# Patient Record
(demographics unavailable — no encounter records)

---

## 2024-10-30 NOTE — REVIEW OF SYSTEMS
[Feeling Fatigued] : feeling fatigued [Dyspnea on exertion] : dyspnea during exertion [Negative] : Gastrointestinal

## 2024-10-31 NOTE — HISTORY OF PRESENT ILLNESS
[FreeTextEntry1] : Mr. Thorne is 59 y.o. 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  Currently, he feels tired and short of breath after walking for 1 block or 1 flight of stairs. He has had this ET over the last 2 years. No recent CBC in the chart. Patient follows with cardiologist  who started him on GDMT 2 years ago. His weight has been stable 182-185 lbs off diuretics. Patient is euvolemic on exam. Cr 0.91 K 5.1 HgA1C 8.3. TTE EF 31% 06/1/2023  Patient had a discussion with  and expressed an interest in Barostim/CCM device which can potentially improve exercise tolerance and persistent dyspnea on exertion.

## 2024-10-31 NOTE — ASSESSMENT
[FreeTextEntry1] : CAD/MI, ischemic cardiomyopathy, PVCs induced VT s/p ICD, chronic fatigue and dyspnea on exertion in spite of GDMT. He is euvolemic on exam today. Patient hasn't had a TTE since 2023 and no recent blood BNP available.   6MWT - 290 m, lowest SaO2 98% on RA NYHA class II  Plan:  No change in GDMT till further evaluation: Continue Entresto 24-26 mg BID Continue Metoprolol 25 mg QD Continue Farxiga 10 gm daily Continue Spironolactone 25 mg daily Patient is euvolemic off diuretics Repeat TTE prior to next visit Will get CPET to further assess the source of his exercise limitation.  Blood work including pro-BNP Possible RHC based on above tests Will assess for Barostim vs CCM after CPET /TTE and possibly RHC RTO in 6 weeks    Darleen Oh MD, FACC, [facility]  Advanced Heart Failure/ Mechanical Circulatory Support Pulmonary Hypertension and Cardiac Amyloidosis  [facility]

## 2024-10-31 NOTE — ASSESSMENT
[FreeTextEntry1] : CAD/MI, ischemic cardiomyopathy, PVCs induced VT s/p ICD, chronic fatigue and dyspnea on exertion in spite of GDMT. He is euvolemic on exam today. Patient hasn't had a TTE since 2023 and no recent blood BNP available.   6MWT - 290 m, lowest SaO2 98% on RA NYHA class II  Plan:  No change in GDMT till further evaluation: Continue Entresto 24-26 mg BID Continue Metoprolol 25 mg QD Continue Farxiga 10 gm daily Continue Spironolactone 25 mg daily Patient is euvolemic off diuretics Repeat TTE prior to next visit Will get CPET to further assess the source of his exercise limitation.  Blood work including pro-BNP Possible RHC based on above tests Will assess for Barostim vs CCM after CPET /TTE and possibly RHC RTO in 6 weeks    Darleen Oh MD, FACC, [facility]  Advanced Heart Failure/ Mechanical Circulatory Support Pulmonary Hypertension and Cardiac Amyloidosis  [facility]    [Fever] : fever [Nasal Discharge] : nasal discharge [Nasal Congestion] : nasal congestion [Negative] : Genitourinary

## 2024-12-14 NOTE — ASSESSMENT
[FreeTextEntry1] : CAD/MI, ischemic cardiomyopathy, PVCs induced VT s/p ICD, chronic fatigue and dyspnea on exertion in spite of GDMT. He is euvolemic on exam today. Patient hasn't had a TTE since 2023 and no recent blood BNP available.   6MWT - 290 m, lowest SaO2 98% on RA NYHA class II  Plan:   Continue Entresto 24-26 mg BID Increase Metoprolol 25 mg to twice daily Continue Farxiga 10 gm daily Continue Spironolactone 25 mg daily Patient is euvolemic off diuretics Will  send to cardiac rehab  Will call in two weeks to assess response to increase BB and keep titrating as tolerated  Will assess for Barostim after repeating TTE next visit  RTO in 3 months    Darleen Oh MD, FACC, [facility]  Advanced Heart Failure/ Mechanical Circulatory Support Pulmonary Hypertension and Cardiac Amyloidosis  [facility]

## 2024-12-14 NOTE — HISTORY OF PRESENT ILLNESS
[FreeTextEntry1] : Mr. Thorne is 59 y.o. 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  Currently, he feels tired and short of breath after walking for 1 block or 1 flight of stairs. He has had this ET over the last 2 years. No recent CBC in the chart. Patient follows with cardiologist  who started him on GDMT 2 years ago. His weight has been stable 182-185 lbs off diuretics. Patient is euvolemic on exam. Cr 0.91 K 5.1 HgA1C 8.3. TTE EF 31% 06/1/2023  Patient had a discussion with  and expressed an interest in Barostim/CCM device which can potentially improve exercise tolerance and persistent dyspnea on exertion.  12/12/2024: Patient presents for F/U visit on ischemic cardiomyopathy. He feels at baseline, no noticeable dyspnea with walking on a flat surface. Recent CPET was c/w physical deconditioning and possible need to optimize his GDMT. Today, BP 90/60; he doesn't check BP at home. He lost a few pounds while using Ozempic but self-discontinued it 3 weeks ago because of nausea and abdominal pain with improvement in these symptoms. Patient is euvolemic on exam off diuretics. proBNP 248 Cr 0.8 K 4.4

## 2024-12-14 NOTE — CARDIOLOGY SUMMARY
[de-identified] : CPET 11/06/2024: Decrease in ET is related to CM with component of deconditioning.  [de-identified] : TTE 11/01/2024: 1. Left ventricular cavity is normal in size. Left ventricular wall thickness is normal. Left ventricular systolic function is severely decreased with an ejection fraction of 32 % by Snow's method of disks. 2. Multiple segmental abnormalities exist. See findings. 3. There is mild (grade 1) left ventricular diastolic dysfunction. 4. Mild tricuspid regurgitation. 5. There is a device lead seen in the right atrium.

## 2024-12-14 NOTE — CARDIOLOGY SUMMARY
[de-identified] : CPET 11/06/2024: Decrease in ET is related to CM with component of deconditioning.  [de-identified] : TTE 11/01/2024: 1. Left ventricular cavity is normal in size. Left ventricular wall thickness is normal. Left ventricular systolic function is severely decreased with an ejection fraction of 32 % by Snow's method of disks. 2. Multiple segmental abnormalities exist. See findings. 3. There is mild (grade 1) left ventricular diastolic dysfunction. 4. Mild tricuspid regurgitation. 5. There is a device lead seen in the right atrium.

## 2024-12-14 NOTE — ASSESSMENT
[FreeTextEntry1] : CAD/MI, ischemic cardiomyopathy, PVCs induced VT s/p ICD, chronic fatigue and dyspnea on exertion in spite of GDMT. He is euvolemic on exam today. Patient hasn't had a TTE since 2023 and no recent blood BNP available.   6MWT - 290 m, lowest SaO2 98% on RA NYHA class II  Plan:   Continue Entresto 24-26 mg BID Increase Metoprolol 25 mg to twice daily Continue Farxiga 10 gm daily Continue Spironolactone 25 mg daily Patient is euvolemic off diuretics Will  send to cardiac rehab  Will call in two weeks to assess response to increase BB and keep titrating as tolerated  Will assess for Barostim after repeating TTE next visit  RTO in 3 months    Darleen Oh MD, FACC, [facility]  Advanced Heart Failure/ Mechanical Circulatory Support Pulmonary Hypertension and Cardiac Amyloidosis  [facility]

## 2025-03-05 NOTE — ASSESSMENT
[FreeTextEntry1] : Anterior wall myocardial infarction\par  AICD implant\par  Severe LV systolic dysfunction LVEF=25-30% previously, now LVEF=31%\par  CAD\par  Polymorphic VT\par  DM with elevated HgA1c.\par  Hyperlipidemia\par  Obesity\par  History of DFU with PVD

## 2025-03-05 NOTE — PHYSICAL EXAM
[Well Developed] : well developed [Well Nourished] : well nourished [No Acute Distress] : no acute distress [Normal Conjunctiva] : normal conjunctiva [Normal Venous Pressure] : normal venous pressure [No Carotid Bruit] : no carotid bruit [Normal S1, S2] : normal S1, S2 [No Rub] : no rub [No Gallop] : no gallop [Clear Lung Fields] : clear lung fields [Good Air Entry] : good air entry [No Respiratory Distress] : no respiratory distress  [Soft] : abdomen soft [Non Tender] : non-tender [No Masses/organomegaly] : no masses/organomegaly [Normal Bowel Sounds] : normal bowel sounds [Normal Gait] : normal gait [No Edema] : no edema [No Cyanosis] : no cyanosis [No Clubbing] : no clubbing [No Varicosities] : no varicosities [No Rash] : no rash [Moves all extremities] : moves all extremities [No Focal Deficits] : no focal deficits [Normal Speech] : normal speech [Alert and Oriented] : alert and oriented [Normal memory] : normal memory [de-identified] : S3 noted. AICD implant site is healed. [de-identified] : No crackles at bases [de-identified] : Venous stasis changes

## 2025-03-05 NOTE — DISCUSSION/SUMMARY
[EKG obtained to assist in diagnosis and management of assessed problem(s)] : EKG obtained to assist in diagnosis and management of assessed problem(s) [FreeTextEntry1] : Patient was advised to maintain his present medications. Patient had a viability scan performed on his first hospital admission and was found not to be a candidate for revascularization due to lack of myocardial viability. BMP CBC lipid panel and hepatic panel prior to the next office visit. Patient's repeat TTE showed slight improvement in his LVEF to 31% EKG: NSR rate of 68 bpm, prior AWMI Low sodium diet Low cholesterol and low fat diet. Continue with risk factor modification. Maintain an exercise regimen of >150 minutes per week as tolerated. Pt. is attending cardiac rehab at [facility]. CBC BMP lipid and hepatic panel, HgA1c level RV in 6 months

## 2025-03-05 NOTE — REASON FOR VISIT
[FreeTextEntry1] : Patient presents for a follow up Cardiology visit. He is off amiodarone now and has seen EPS. No symptoms reported. He is here to review his recent lab test.

## 2025-03-05 NOTE — HISTORY OF PRESENT ILLNESS
[FreeTextEntry1] : AWMI\par  LV systolic dysfunction\par  AICD implant\par  Polymorphic VT\par  DM\par  Nonhealing foot ulcers of LE\par  PVD\par  Hyperlipidemia\par  Patient denies a history of prior MI, CHF, arrhythmia, TIA, CVA, syncope, obesity, cigarette smoking.\par  PSurgHx: Right and left foot surgeries for nonhealing diabetic foot ulcers. Right foot was operated on 2021 left foot three years prior.\par  Patient had COVID-19 infection.\par  No prior cardiac testing performed.

## 2025-03-06 NOTE — HISTORY OF PRESENT ILLNESS
[de-identified] : Mr. PORTILLO is a 57 year-year old male with history of CAD/MI, PVD, DM, DL, Ischemic Cardiomyopathy, PVC induced polymorphic VT s/p DC-ICD (SJM, 22, Non-dep) is here for device care.  Feels great. Lost ~ 10 lbs intentional in last week.  11/9: Had PVC induced VF s/p ICD shock. Was admitted at [facility] for further work-up. 12/21/2022: S/p PVC/VF ablation. Feels fine. No further episodes.  03/22/2023: Feels fine. Saw Dr. Cortés. Amiodarone level low.  09/20/2023: Feels fine. No further episodes of palpitations or shocks. Lost weight (intentional). 03/20/2024: Feels fine. Gained some weight. Denies any complaints.   09/04/2024: Feels okay overall. Dyspnea on exertion.   03/05/2025: Feels fine. Denies any complaints. Continues to have mild TUCKER. Planning to see Dr. Araujo very soon.   Denies chest pain, shortness of breath, palpitation, dizziness or LOC except noted above.  EKG (03/05/2025): SR   EKG (09/04/2024): SR  EKG (03/20/2024): SR @ 93, , QRS 98, QTc 411   EKG (09/20/2023): SR @ 87, , QRS 98, QTc 440 EKG (03/22/2023): SR @ 77 bpm, , , , no PVCs. EKG (12/21/2022): SR @ 80, , ,  no PVCs.  EKG (11/9/22): SR EKG (09/14/22): SR 63, QRS 94 TTE (09/22): EF 25-30%, Nl RA/LA Cardio: Dr. Cortés

## 2025-03-06 NOTE — ASSESSMENT
[FreeTextEntry1] : ## Ischemic Cardiomyopathy  ## PVC induced polymorphic VT s/p DC-ICD (SJM, 22, Non-dep) s/p PVC/PMVT Ablation  - ICD interrogation shows normally functioning DC-ICD. Battery life ok. Optivol below threshold. No new events. - No further PMVT/ VF event - Continue with Metoprolol 25 mg. Can increase dose as needed by cardiology team.  - Remains off Amiodarone. No further episodes of PVC, PMVT, or VF. Will continue to monitor off Amiodarone.  - GDMT as per cardio.  - Patient continues to have TUCKER. Discussed the potential option of Barostim/CCM device. Information provided. He is interested. Will follow with Dr. Araujo to assess for eligibility. Considering his age and continue TUCKER along with GDMT, patient is likely a great candidate for CCM device. To be discussed with HF team.  - Remote Monitoring  - RTC in 6 months.

## 2025-03-06 NOTE — END OF VISIT
[FreeTextEntry3] :   All medical record entries made by my scribe were at my, Dr. Bernadette Kenney, direction and personally dictated by me. I have reviewed the chart and agree that the record accurately reflects my personal performance of the history, physical exam, assessment and plan. I have also personally directed, reviewed, and agreed with the chart.

## 2025-03-06 NOTE — PHYSICAL EXAM
[Normal Appearance] : normal appearance [General Appearance - Well Developed] : well developed [Well Groomed] : well groomed [General Appearance - Well Nourished] : well nourished [No Deformities] : no deformities [General Appearance - In No Acute Distress] : no acute distress [Heart Rate And Rhythm] : heart rate and rhythm were normal [Heart Sounds] : normal S1 and S2 [Murmurs] : no murmurs present [Clean] : clean [Dry] : dry [Healing Well] : healing well [Nail Clubbing] : no clubbing of the fingernails [Cyanosis, Localized] : no localized cyanosis [Petechial Hemorrhages (___cm)] : no petechial hemorrhages [] : no ischemic changes

## 2025-03-06 NOTE — HISTORY OF PRESENT ILLNESS
[de-identified] : Mr. PORTILLO is a 57 year-year old male with history of CAD/MI, PVD, DM, DL, Ischemic Cardiomyopathy, PVC induced polymorphic VT s/p DC-ICD (SJM, 22, Non-dep) is here for device care.  Feels great. Lost ~ 10 lbs intentional in last week.  11/9: Had PVC induced VF s/p ICD shock. Was admitted at [facility] for further work-up. 12/21/2022: S/p PVC/VF ablation. Feels fine. No further episodes.  03/22/2023: Feels fine. Saw Dr. Cortés. Amiodarone level low.  09/20/2023: Feels fine. No further episodes of palpitations or shocks. Lost weight (intentional). 03/20/2024: Feels fine. Gained some weight. Denies any complaints.   09/04/2024: Feels okay overall. Dyspnea on exertion.   03/05/2025: Feels fine. Denies any complaints. Continues to have mild TUCKER. Planning to see Dr. Araujo very soon.   Denies chest pain, shortness of breath, palpitation, dizziness or LOC except noted above.  EKG (03/05/2025): SR   EKG (09/04/2024): SR  EKG (03/20/2024): SR @ 93, , QRS 98, QTc 411   EKG (09/20/2023): SR @ 87, , QRS 98, QTc 440 EKG (03/22/2023): SR @ 77 bpm, , , , no PVCs. EKG (12/21/2022): SR @ 80, , ,  no PVCs.  EKG (11/9/22): SR EKG (09/14/22): SR 63, QRS 94 TTE (09/22): EF 25-30%, Nl RA/LA Cardio: Dr. Cortés

## 2025-03-06 NOTE — ADDENDUM
[FreeTextEntry1] : So SANTOS assisted in documentation on 03/06/2025 acting as a scribe for Dr. Bernadette Kenney.

## 2025-04-09 NOTE — ASSESSMENT
[FreeTextEntry1] : CAD/MI, ischemic cardiomyopathy, PVCs induced VT s/p ICD, chronic fatigue and dyspnea on exertion in spite of GDMT. He is euvolemic on exam today. Patient hasn't had a TTE since 2023 and no recent blood BNP available.   6MWT - 290 m, lowest SaO2 98% on RA NYHA class II  Plan:   Continue Entresto 24-26 mg BID Increase Metoprolol 25 mg to twice daily Continue Farxiga 10 gm daily Continue Spironolactone 25 mg daily Patient is euvolemic off diuretics Will  send to cardiac rehab  Will call in two weeks to assess response to increase BB and keep titrating as tolerated  I don't think there's a need to evaluate the patient for Barostim at this time RTO in 3 months    Darleen Oh MD, FACC, [facility]  Advanced Heart Failure/ Mechanical Circulatory Support Pulmonary Hypertension and Cardiac Amyloidosis  [facility]

## 2025-04-09 NOTE — CARDIOLOGY SUMMARY
[de-identified] : CPET 11/06/2024: Decrease in ET is related to CM with component of deconditioning.  [de-identified] : TTE 11/01/2024: 1. Left ventricular cavity is normal in size. Left ventricular wall thickness is normal. Left ventricular systolic function is severely decreased with an ejection fraction of 32 % by Snow's method of disks. 2. Multiple segmental abnormalities exist. See findings. 3. There is mild (grade 1) left ventricular diastolic dysfunction. 4. Mild tricuspid regurgitation. 5. There is a device lead seen in the right atrium.

## 2025-04-09 NOTE — REVIEW OF SYSTEMS
[Feeling Fatigued] : feeling fatigued [Dyspnea on exertion] : dyspnea during exertion [Negative] : Gastrointestinal [Dizziness] : no dizziness

## 2025-04-09 NOTE — HISTORY OF PRESENT ILLNESS
[FreeTextEntry1] : Mr. Thorne is 59 y.o. 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  Currently, he feels tired and short of breath after walking for 1 block or 1 flight of stairs. He has had this ET over the last 2 years. No recent CBC in the chart. Patient follows with cardiologist  who started him on GDMT 2 years ago. His weight has been stable 182-185 lbs off diuretics. Patient is euvolemic on exam. Cr 0.91 K 5.1 HgA1C 8.3. TTE EF 31% 06/1/2023  Patient had a discussion with  and expressed an interest in Barostim/CCM device which can potentially improve exercise tolerance and persistent dyspnea on exertion.  12/12/2024: Patient presents for F/U visit on ischemic cardiomyopathy. He feels at baseline, no noticeable dyspnea with walking on a flat surface. Recent CPET was c/w physical deconditioning and possible need to optimize his GDMT. Today, BP 90/60; he doesn't check BP at home. He lost a few pounds while using Ozempic but self-discontinued it 3 weeks ago because of nausea and abdominal pain with improvement in these symptoms. Patient is euvolemic on exam off diuretics. proBNP 248 Cr 0.8 K 4.4  04/09/2025: Patient presents for F/U visit for management of HFrEF.. He reports feeling at his baseline, no change in symptoms or ET since last visit in 12/2024, can walk for 20 min on a flat surface without dyspnea, denies chest pain, dizziness, leg edema, palpitations. Patient states that his weight has been stable, off diuretics, euvolemic on exam IVC 2.2 cm, collapsing >50%. BP 90/60 on average, 100/70 with activity. He had to stop cardiac rehab because he developed left foot ulcer 2 weeks ago. His HgA1C 8.4 as of February 2025, was not rechecked, medical management was not adjusted.   As of 02/2025, CR 0.77, BUN 20, K 5.0 HgA1C 8.4.   TTE done 4/3/2025 shows slightly improvement in LV systolic function, LVEF ~40% from low 30%.

## 2025-07-24 NOTE — BEGINNING OF VISIT
[0] : 2) Feeling down, depressed, or hopeless: Not at all (0) [PHQ-2 Negative] : PHQ-2 Negative [Never] : Never [Patient/Caregiver not ready to engage] : Patient/Caregiver not ready to engage

## 2025-07-26 NOTE — CONSULT LETTER
[Dear  ___] : Dear  [unfilled], [Courtesy Letter:] : I had the pleasure of seeing your patient, [unfilled], in my office today. [Please see my note below.] : Please see my note below. [Consult Closing:] : Thank you very much for allowing me to participate in the care of this patient.  If you have any questions, please do not hesitate to contact me. [Sincerely,] : Sincerely, [FreeTextEntry3] : (Shirlye Faat) Brendon Cárdenas DO Medical oncology/hematology at [facility]

## 2025-07-26 NOTE — RESULTS/DATA
[facility] DEPARTMENT ENCOUNTER    02/25/22 2:22 PM    CHIEF COMPLAINT   Chief Complaint   Patient presents with   • Jaundice Adult       HPI    Serene Cuba is a 19 year old adult with history of ADD, anxiety, depression, reactive airway disease, who presents to the ED with complaints of jaundice.  The patient states he noticed his skin appeared slightly yellow yesterday and when he woke up this morning he feels like the light of his eyes are more yellow in color.  He admits that he had some abdominal pain with nausea 4 days ago but that has since improved.  He is taking exogenous testosterone for gender transition (female to male).  States he has been on this medication since 01/06/2022.  He is concerned he may be on too high a dose at this medication because “all of his friends that her taking it have a lower dose. ”  He does not drink alcohol.  No other drug use.  Denies excessive Tylenol consumption.    History obtained from patient   used: no     PAST MEDICAL HISTORY    Past Medical History:   Diagnosis Date   • ADD (attention deficit disorder)    • Anxiety    • Depressive disorder    • RAD (reactive airway disease)        SURGICAL HISTORY    Past Surgical History:   Procedure Laterality Date   • Hernia repair Right        CURRENT MEDICATIONS    No current facility-administered medications for this encounter.     Current Outpatient Medications   Medication Sig Dispense Refill   • [START ON 2/28/2022] Testosterone Cypionate 200 MG/ML Solution Inject 0.5 mLs as directed 1 day a week. 5 mL 5   • Needle, Disp, 25G X 5/8\" Misc Use weekly 12 each 3   • Syringe, Disposable, (Syringe Luer Slip) 1 ML Misc 1 each 1 day a week. 12 each 3   • Needle, Disp, 18G X 1\" Misc Use once weekly to draw medication from vial. Dispose of needle after drawing up medication. 12 each 3   • Vitamin D, Ergocalciferol, 1.25 mg (50,000 units) capsule Take 1 capsule by mouth 1 day a week. 12 capsule 0   • Cholecalciferol  (Vitamin D-3) 125 mcg (5,000 units) tablet Take 1 tablet by mouth daily. 30 tablet 0   • IPRATROPIUM-ALBUTEROL IN          ALLERGIES   ALLERGIES:   Allergen Reactions   • Lactose Intolerance   (Food Or Med) DIARRHEA       SOCIAL HISTORY    Social History     Tobacco Use   • Smoking status: Never Smoker   • Smokeless tobacco: Never Used   Vaping Use   • Vaping Use: never used   Substance Use Topics   • Alcohol use: No     Alcohol/week: 0.0 standard drinks   • Drug use: No       FAMILY HISTORY    Family History   Problem Relation Age of Onset   • Anxiety disorder Mother    • Depression Mother    • Heart disease Paternal Grandfather    • Diabetes Paternal Aunt    • Diabetes Paternal Uncle        REVIEW OF SYSTEMS    Review of Systems   Constitutional: Negative for chills and fever.   Eyes:        Yellow discoloration to the eyes    Respiratory: Negative for shortness of breath.    Cardiovascular: Negative for chest pain.   Gastrointestinal: Positive for abdominal pain and nausea. Negative for diarrhea and vomiting.   Genitourinary:        Dark colored urine    Skin:        Jaundice.    All other systems reviewed and are negative.       PHYSICAL EXAM    Vitals:    02/25/22 1533 02/25/22 1633 02/25/22 1733 02/25/22 1752   BP:    112/70   BP Location:       Patient Position:       Pulse: 79 88 78 74   Resp: 19   20   Temp:       TempSrc:       SpO2: 98% 99% 98% 100%   Weight:       Height:       LMP: 01/01/2022       Physical Exam  Vitals and nursing note reviewed.   Constitutional:       Appearance: Normal appearance.   HENT:      Head: Normocephalic and atraumatic.      Right Ear: External ear normal.      Left Ear: External ear normal.      Mouth/Throat:      Mouth: Mucous membranes are moist.   Eyes:      General: Scleral icterus present.      Conjunctiva/sclera: Conjunctivae normal.   Cardiovascular:      Rate and Rhythm: Normal rate.   Pulmonary:      Effort: Pulmonary effort is normal. No respiratory distress.    Abdominal:      General: Abdomen is flat.      Palpations: Abdomen is soft.      Tenderness: There is abdominal tenderness (mild to the RUQ). There is no guarding or rebound.   Musculoskeletal:      Cervical back: Normal range of motion.      Comments: FROM of bilateral upper and lower extremities.   Skin:     General: Skin is warm and dry.      Coloration: Skin is jaundiced.   Neurological:      General: No focal deficit present.      Mental Status: He is alert and oriented to person, place, and time.   Psychiatric:         Mood and Affect: Mood normal.         Behavior: Behavior normal.          LABS    Results for orders placed or performed during the hospital encounter of 02/25/22   Comprehensive Metabolic Panel   Result Value    Fasting Status     Sodium 136    Potassium 4.1     Comment: Slight hemolysis, result may be falsely increased.    Chloride 101    Carbon Dioxide 25    Anion Gap 14    Glucose 100 (H)    BUN 7    Creatinine 0.84    Glomerular Filtration Rate >90     Comment: eGFR results = or >60 mL/min/1.73m2 = Normal kidney function. Estimated GFR calculated using the 2009 CKD-EPI creatinine equation.      BUN/ Creatinine Ratio 8    Calcium 8.9    Bilirubin, Total 6.9 (H)    GOT/AST 1,200 (H)     Comment: Slight hemolysis, result may be falsely increased.    GPT/ALT 1,772 (H)    Alkaline Phosphatase 236 (H)    Albumin 3.8    Protein, Total 7.0    Globulin 3.2    A/G Ratio 1.2   CBC with Automated Differential (performable only)   Result Value    WBC 6.9    RBC 5.31 (H)    HGB 16.0    HCT 46.6    MCV 87.8    MCH 30.1    MCHC 34.3    RDW-CV 13.1    RDW-SD 42.2        NRBC 0    Neutrophil, Percent 52     Comment: Auto diff verified by manual slide review    Lymphocytes, Percent 28    Mono, Percent 17    Eosinophils, Percent 2    Basophils, Percent 1    Immature Granulocytes 0    Absolute Neutrophils 3.6    Absolute Lymphocytes 1.9    Absolute Monocytes 1.1 (H)    Absolute Eosinophils  0.2     Absolute Basophils 0.0    Absolute Immmature Granulocytes 0.0   Urinalysis & Reflex Microscopy With Culture If Indicated   Result Value    COLOR, URINALYSIS Kirsten    APPEARANCE, URINALYSIS Clear    GLUCOSE, URINALYSIS Negative    BILIRUBIN, URINALYSIS Positive (A)    KETONES, URINALYSIS Negative    SPECIFIC GRAVITY, URINALYSIS 1.010    OCCULT BLOOD, URINALYSIS Negative    PH, URINALYSIS 6.0    PROTEIN, URINALYSIS Negative    UROBILINOGEN, URINALYSIS 1.0    NITRITE, URINALYSIS Negative    LEUKOCYTE ESTERASE, URINALYSIS Trace (A)    SQUAMOUS EPITHELIAL, URINALYSIS 1 to 5    ERYTHROCYTES, URINALYSIS None Seen    LEUKOCYTES, URINALYSIS 1 to 5    BACTERIA, URINALYSIS None Seen    HYALINE CASTS, URINALYSIS None Seen   Acetaminophen Level   Result Value    Acetaminophen <2 (L)   Prothrombin Time   Result Value    Prothrombin Time 12.4 (H)    INR 1.2     Comment: INR Therapeutic Range: 2.0 to 3.0 (2.5 to 3.5 recommended for recurrent thrombotic episodes and mechanical prosthetic heart valves.)   Partial Thromboplastin Time   Result Value    PTT 30       ED MEDICATIONS      ED Medication Orders (From admission, onward)    Ordered Start     Status Ordering Provider    02/25/22 1521 02/25/22 1530  sodium chloride (NORMAL SALINE) 0.9 % bolus 1,000 mL  ONCE         Last MAR action: Completed LAURENT SUAREZ          PROCEDURES    Procedures       ED Course and MEDICAL DECISION MAKING    Serene Cuba is a 19 year old adult comes to the ED with leonora. Please see HPI and exam for further details. Patients medical records as well as past medical/surgical/family/social history were reviewed.  Patient is resting, NAD. He appears jaundiced with scleral icterus. Mild tenderness to the RUQ on exam. He is on testosterone for gender transition for the past 2 months.   Plan for labs, urine, US.   Will re-evaluate.     Reassessment:   Labs as noted above.   No leukocytosis or anemia.   Pt with significant elevation in LFT's. AST is 1200,  ALT is 1772, and Alk Phos is 236 with total bili of 6.9.   US GB/Liver/Pancreas obtained and unremarkable.     I spoke with JACK Ward, who recommends obtaining INR and hepatitis panel. Will call him back after these results.     At this point, I have considered hepatotoxicity related to exogenous testosterone, possible hepatitis, other drugs or oral viral illness.    Reassessment:   INR normal.   Hep panel pending -- send out.   Acetaminophen level normal.   UA with no signs of infection.     I spoke with JACK Ward, again. Patient is otherwise feeling well at this time. No significant abdominal pain. No vomiting. No AMS or confusion. Will plan for dc home with close follow up with PCP and GI. Patient is to STOP testosterone.  I will place orders for repeat CMP and INR to be performed in 2 days.     I discussed plan with patient and Mom who are in agreement with this plan.   Patient will stop testosterone.   I discussed strict return precautions.   All questions answered.     Impression:  The primary encounter diagnosis was Jaundice. A diagnosis of Elevated LFTs was also pertinent to this visit.    Follow Up:  Rodney Cervantes MD  [address] [facility] [id number]    Schedule an appointment as soon as possible for a visit   for follow up with GI    [facility] Emergency Department  [address] [facility] [address] [phone]  Go to   If symptoms worsen    Dalila Valle MD  [address] [facility] [address] [phone]    Go to   for follow up with pcp    Instructions  STOP testosterone   Get repeat labs drawn on Monday at [place] outpatient lab   Follow up with primary doctor as well as GI doctor   Return with any worsening symptoms: abdominal pain, vomiting, not acting normally, confusion, fever           Discharge Medication List as of 2/25/2022  5:40 PM           Summary of your Discharge Medications      You have not been prescribed any medications.          Pt is discharged in stable condition.     Caitie NAM PA-C, am working under the clinical supervision of attending physician, Dr. Kilgore.        Caitie Miller PA-C  02/25/22 2584     [FreeTextEntry1] : PET/CT 7/26/25 IMPRESSION: Intense pathologic FDG uptake (SUV up to 11.1),co registering with known distal esophageal extending to the gastroesophageal junction mass measuring up to 9.2 cm in height on coronal image emission images In addition pathologic FDG uptake in multiple right paratracheal nodes, carinal , right and left hilar and left suprahilar lymph nodes suspicious for biologic tumor activity No other definite sites of pathologic FDG uptake

## 2025-07-26 NOTE — HISTORY OF PRESENT ILLNESS
[Treatment Protocol] : Treatment Protocol [ECOG Performance Status: 1 - Restricted in physically strenuous activity but ambulatory and able to carry out work of a light or sedentary nature] : Performance Status: 1 - Restricted in physically strenuous activity but ambulatory and able to carry out work of a light or sedentary nature, e.g., light house work, office work [de-identified] : 60-year-old male with history of CAD/MI, PVD, DM, DL, Ischemic Cardiomyopathy (EF 24% on TTE 7/6/25), PVC induced polymorphic VTs s/p DC-ICD presnted to the hospital from 7/5/25 to 7/15/25 due to SOB and fatigue. He was found to be hypotensive with hb 5.8 s/p 2U of PRBC. Additionally, he was found to have MSSA and group B strep bacteremia.  Patient underwent EGD and colonoscopy 7/9, which demonstrated at 10 cm infiltrative, fungating, ulcerated, necrotic mass with sequelae of recent bleeding that extended from 30 cm past incisors to cardia of the stomach. Patient denies any prior endoscopy studies. He also denies smoking or alcohol use (lifelong), or personal history of cancer. 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 No right heart strain. Lower esophageal wall thickening up to the proximal stomach measuring approximately 4.6 x 8.3 x 9.3 cm, compatible with endoscopy finding of esophageal mass.  IMPRESSION: 1.  Marked circumferential masslike distal esophageal wall thickening, compatible with known esophageal mass. 2.Nonspecific mixed sclerotic/lucent lesion in the left iliac bone. Benign etiology is favored. However, further evaluation with a PET/CT or nuclear medicine bone scan may be of use. 3.  No abdominopelvic lymphadenopathy or definite sites of metastatic disease. 4.  Enlarged prostate gland.  He subsequently underwent PET/CT: IMPRESSION: Intense pathologic FDG uptake (SUV up to 11.1),co registering with known distal esophageal extending to the gastroesophageal junction mass measuring up to 9.2 cm in height on coronal image emission images In addition pathologic FDG uptake in multiple right paratracheal nodes, carinal , right and left hilar and left suprahilar lymph nodes is suspicious for biologic tumor activity No other definite sites of pathologic FDG uptake.  Today, Daniel presents with his wife for the evaluation. He reports feeling stronger since discharged from the hospital. He is motivated to beat his cancer. He has no new complaint to offer. He continues to report fatigue and SOB on exertion.  Family History : - Maternal grandfather had cancer, unknown - Maternal uncle had brain cancer Social History : - Occupation: Retired from construction (cement and concrete work) - Alcohol use: Social drinker, occasional drink with dinner - Tobacco use: Never smoker - Substance use: No reported illicit drug use [FreeTextEntry1] : Planning for Nivo 240 mg every 2 weeks and Ipi 1 mg/kg every 6 weeks for 12 weeks followed by surgery and adjuvant nivolumab 480 mg every 4 weeks for [facility]

## 2025-07-26 NOTE — CONSULT LETTER
[Dear  ___] : Dear  [unfilled], [Courtesy Letter:] : I had the pleasure of seeing your patient, [unfilled], in my office today. [Please see my note below.] : Please see my note below. [Consult Closing:] : Thank you very much for allowing me to participate in the care of this patient.  If you have any questions, please do not hesitate to contact me. [Sincerely,] : Sincerely, [FreeTextEntry3] : (Shirley Faat) Brendon Cárdenas DO Medical oncology/hematology at [facility]

## 2025-07-26 NOTE — HISTORY OF PRESENT ILLNESS
[Treatment Protocol] : Treatment Protocol [ECOG Performance Status: 1 - Restricted in physically strenuous activity but ambulatory and able to carry out work of a light or sedentary nature] : Performance Status: 1 - Restricted in physically strenuous activity but ambulatory and able to carry out work of a light or sedentary nature, e.g., light house work, office work [de-identified] : 60-year-old male with history of CAD/MI, PVD, DM, DL, Ischemic Cardiomyopathy (EF 24% on TTE 7/6/25), PVC induced polymorphic VTs s/p DC-ICD presnted to the hospital from 7/5/25 to 7/15/25 due to SOB and fatigue. He was found to be hypotensive with hb 5.8 s/p 2U of PRBC. Additionally, he was found to have MSSA and group B strep bacteremia.  Patient underwent EGD and colonoscopy 7/9, which demonstrated at 10 cm infiltrative, fungating, ulcerated, necrotic mass with sequelae of recent bleeding that extended from 30 cm past incisors to cardia of the stomach. Patient denies any prior endoscopy studies. He also denies smoking or alcohol use (lifelong), or personal history of cancer. 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 No right heart strain. Lower esophageal wall thickening up to the proximal stomach measuring approximately 4.6 x 8.3 x 9.3 cm, compatible with endoscopy finding of esophageal mass.  IMPRESSION: 1.  Marked circumferential masslike distal esophageal wall thickening, compatible with known esophageal mass. 2.Nonspecific mixed sclerotic/lucent lesion in the left iliac bone. Benign etiology is favored. However, further evaluation with a PET/CT or nuclear medicine bone scan may be of use. 3.  No abdominopelvic lymphadenopathy or definite sites of metastatic disease. 4.  Enlarged prostate gland.  He subsequently underwent PET/CT: IMPRESSION: Intense pathologic FDG uptake (SUV up to 11.1),co registering with known distal esophageal extending to the gastroesophageal junction mass measuring up to 9.2 cm in height on coronal image emission images In addition pathologic FDG uptake in multiple right paratracheal nodes, carinal , right and left hilar and left suprahilar lymph nodes is suspicious for biologic tumor activity No other definite sites of pathologic FDG uptake.  Today, Daniel presents with his wife for the evaluation. He reports feeling stronger since discharged from the hospital. He is motivated to beat his cancer. He has no new complaint to offer. He continues to report fatigue and SOB on exertion.  Family History : - Maternal grandfather had cancer, unknown - Maternal uncle had brain cancer Social History : - Occupation: Retired from construction (cement and concrete work) - Alcohol use: Social drinker, occasional drink with dinner - Tobacco use: Never smoker - Substance use: No reported illicit drug use [FreeTextEntry1] : Planning for Nivo 240 mg every 2 weeks and Ipi 1 mg/kg every 6 weeks for 12 weeks followed by surgery and adjuvant nivolumab 480 mg every 4 weeks for [facility]

## 2025-07-26 NOTE — HISTORY OF PRESENT ILLNESS
[Treatment Protocol] : Treatment Protocol [ECOG Performance Status: 1 - Restricted in physically strenuous activity but ambulatory and able to carry out work of a light or sedentary nature] : Performance Status: 1 - Restricted in physically strenuous activity but ambulatory and able to carry out work of a light or sedentary nature, e.g., light house work, office work [de-identified] : 60-year-old male with history of CAD/MI, PVD, DM, DL, Ischemic Cardiomyopathy (EF 24% on TTE 7/6/25), PVC induced polymorphic VTs s/p DC-ICD presnted to the hospital from 7/5/25 to 7/15/25 due to SOB and fatigue. He was found to be hypotensive with hb 5.8 s/p 2U of PRBC. Additionally, he was found to have MSSA and group B strep bacteremia.  Patient underwent EGD and colonoscopy 7/9, which demonstrated at 10 cm infiltrative, fungating, ulcerated, necrotic mass with sequelae of recent bleeding that extended from 30 cm past incisors to cardia of the stomach. Patient denies any prior endoscopy studies. He also denies smoking or alcohol use (lifelong), or personal history of cancer. 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 No right heart strain. Lower esophageal wall thickening up to the proximal stomach measuring approximately 4.6 x 8.3 x 9.3 cm, compatible with endoscopy finding of esophageal mass.  IMPRESSION: 1.  Marked circumferential masslike distal esophageal wall thickening, compatible with known esophageal mass. 2.Nonspecific mixed sclerotic/lucent lesion in the left iliac bone. Benign etiology is favored. However, further evaluation with a PET/CT or nuclear medicine bone scan may be of use. 3.  No abdominopelvic lymphadenopathy or definite sites of metastatic disease. 4.  Enlarged prostate gland.  He subsequently underwent PET/CT: IMPRESSION: Intense pathologic FDG uptake (SUV up to 11.1),co registering with known distal esophageal extending to the gastroesophageal junction mass measuring up to 9.2 cm in height on coronal image emission images In addition pathologic FDG uptake in multiple right paratracheal nodes, carinal , right and left hilar and left suprahilar lymph nodes is suspicious for biologic tumor activity No other definite sites of pathologic FDG uptake.  Today, Daniel presents with his wife for the evaluation. He reports feeling stronger since discharged from the hospital. He is motivated to beat his cancer. He has no new complaint to offer. He continues to report fatigue and SOB on exertion.  Family History : - Maternal grandfather had cancer, unknown - Maternal uncle had brain cancer Social History : - Occupation: Retired from construction (cement and concrete work) - Alcohol use: Social drinker, occasional drink with dinner - Tobacco use: Never smoker - Substance use: No reported illicit drug use [FreeTextEntry1] : Planning for Nivo 240 mg every 2 weeks and Ipi 1 mg/kg every 6 weeks for 12 weeks followed by surgery and adjuvant nivolumab 480 mg every 4 weeks [facility]

## 2025-07-26 NOTE — CONSULT LETTER
[Dear  ___] : Dear  [unfilled], [Courtesy Letter:] : I had the pleasure of seeing your patient, [unfilled], in my office today. [Please see my note below.] : Please see my note below. [Consult Closing:] : Thank you very much for allowing me to participate in the care of this patient.  If you have any questions, please do not hesitate to contact me. [Sincerely,] : Sincerely, [FreeTextEntry3] : (Shirley Faat) Brendon Cárdenas DO Medical oncology/hematology at [facility]

## 2025-07-26 NOTE — ASSESSMENT
[FreeTextEntry1] : In summary, Daniel is a 60-year-old male with an extensive cardiac history including CAD/MI, PVD, DM, DL, Ischemic Cardiomyopathy (EF 24% on TTE 7/6/25), PVC induced polymorphic VTs s/p DC-ICD. He was found to have a 10 cm infiltrative, fungating, ulcerated, necrotic mass in the GEJ with sequelae of recent bleeding that extended from 30 cm past the incisors to the cardia of the stomach. 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  I had a detailed discussion with the patient and his wife. I reviewed the radiological findings, pathology, and staging with them. I explained that he has at least Stage III GEJ cancer and possibly metastatic disease. I further explained that he has a rare MMR-deficient GEJ adenocarcinoma and shows increased response to immune checkpoint inhibitors. However, due to the rarity of dMMR GEJ adenocarcinoma, there are no large clinical trials to establish a standard of care. Nevertheless, a few phase II trials have shown that immunotherapy has a high response rate as both neoadjuvant treatment and for metastatic disease.   Since there are concerns about metastasis to the cervical lymph nodes, Daniel is scheduled for IR biopsy. For now, we will treat with neoadjuvant Nivo 240 mg every 2 weeks and Ipi 1 mg/kg every 6 weeks for 12 weeks, as per the [facility] NEONIPIGA trial. Then, we will reevaluate for possible surgery, radiation, or immunotherapy maintenance after repeat imaging.   Lastly, we reviewed the side effect profile of Ipi/Nivo, including but not limited to: allergic reactions, fatigue, rashes, hypotension, decreased appetite, increased liver enzymes, constipation, diarrhea, and immune-mediated reaction. Pt understands that this regimen has a higher rate of ICI-toxicities than mono-immunotherapy. Lab work will check for CBC, CMP, and thyroid function. The patient was given instructions on obtaining detailed resources on the medication through the drug 's website and was provided a copy of the patient's drug information sheet from [employer].  PLAN: --CBC, CMP, TSH/FT4 and hepatitis panel in anticipation to start immunotherapy --plan for Nivo 240 mg D1, 15 and D29 and Ipi 1mg/kg D1 every 6 weeks for 2 cycles --repeat PET/CT after 12 weeks of treatment --IR biopsy of cervical lymph node, ordered by CTS team --f/u thoracic sx as scheduled  # Extensive cardiac hx  --f/u Dr. Kenney as scheduled   # Incidental PE  --c/w eliquis 5 mg BID   RTC in 1 week post-D1 of immunotherapy. CBC, CMP 1-2 days before each infusion.   [Palliative Care Plan] : not applicable at this time

## 2025-07-26 NOTE — ASSESSMENT
[FreeTextEntry1] : In summary, Daniel is a 60-year-old male with an extensive cardiac history including CAD/MI, PVD, DM, DL, Ischemic Cardiomyopathy (EF 24% on TTE 7/6/25), PVC induced polymorphic VTs s/p DC-ICD. He was found to have a 10 cm infiltrative, fungating, ulcerated, necrotic mass in the GEJ with sequelae of recent bleeding that extended from 30 cm past the incisors to the cardia of the stomach. 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  I had a detailed discussion with the patient and his wife. I reviewed the radiological findings, pathology, and staging with them. I explained that he has at least Stage III GEJ cancer and possibly metastatic disease. I further explained that he has a rare MMR-deficient GEJ adenocarcinoma and shows increased response to immune checkpoint inhibitors. However, due to the rarity of dMMR GEJ adenocarcinoma, there are no large clinical trials to establish a standard of care. Nevertheless, a few phase II trials have shown that immunotherapy has a high response rate as both neoadjuvant treatment and for metastatic disease.   Since there are concerns about metastasis to the cervical lymph nodes, Daniel is scheduled for IR biopsy. For now, we will treat with neoadjuvant Nivo 240 mg every 2 weeks and Ipi 1 mg/kg every 6 weeks for 12 weeks, as per the [facility]ONIP[facility] trial. Then, we will reevaluate for possible surgery, radiation, or immunotherapy maintenance after repeat imaging.   Lastly, we reviewed the side effect profile of Ipi/Nivo, including but not limited to: allergic reactions, fatigue, rashes, hypotension, decreased appetite, increased liver enzymes, constipation, diarrhea, and immune-mediated reaction. Pt understands that this regimen has a higher rate of ICI-toxicities than mono-immunotherapy. Lab work will check for CBC, CMP, and thyroid function. The patient was given instructions on obtaining detailed resources on the medication through the drug 's website and was provided a copy of the patient's drug information sheet from [employer].  PLAN: --CBC, CMP, TSH/FT4 and hepatitis panel in anticipation to start immunotherapy --plan for Nivo 240 mg D1, 15 and D29 and Ipi 1mg/kg D1 every 6 weeks for 2 cycles --repeat PET/CT after 12 weeks of treatment --IR biopsy of cervical lymph node, ordered by CTS team --f/u thoracic sx as scheduled  # Extensive cardiac hx  --f/u Dr. Kenney as scheduled   # Incidental PE  --c/w eliquis 5 mg BID   RTC in 1 week post-D1 of immunotherapy. CBC, CMP 1-2 days before each infusion.   [Palliative Care Plan] : not applicable at this time

## 2025-07-26 NOTE — ASSESSMENT
[FreeTextEntry1] : In summary, Daniel is a 60-year-old male with an extensive cardiac history including CAD/MI, PVD, DM, DL, Ischemic Cardiomyopathy (EF 24% on TTE 7/6/25), PVC induced polymorphic VTs s/p DC-ICD. He was found to have a 10 cm infiltrative, fungating, ulcerated, necrotic mass in the GEJ with sequelae of recent bleeding that extended from 30 cm past the incisors to the cardia of the stomach. 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  I had a detailed discussion with the patient and his wife. I reviewed the radiological findings, pathology, and staging with them. I explained that he has at least Stage III GEJ cancer and possibly metastatic disease. I further explained that he has a rare MMR-deficient GEJ adenocarcinoma and shows increased response to immune checkpoint inhibitors. However, due to the rarity of dMMR GEJ adenocarcinoma, there are no large clinical trials to establish a standard of care. Nevertheless, a few phase II trials have shown that immunotherapy has a high response rate as both neoadjuvant treatment and for metastatic disease.   Since there are concerns about metastasis to the cervical lymph nodes, Daniel is scheduled for IR biopsy. For now, we will treat with neoadjuvant Nivo 240 mg every 2 weeks and Ipi 1 mg/kg every 6 weeks for 12 weeks, as per the [facility] NEONIPIGA trial. Then, we will reevaluate for possible surgery, radiation, or immunotherapy maintenance after repeat imaging.   Lastly, we reviewed the side effect profile of Ipi/Nivo, including but not limited to: allergic reactions, fatigue, rashes, hypotension, decreased appetite, increased liver enzymes, constipation, diarrhea, and immune-mediated reaction. Pt understands that this regimen has a higher rate of ICI-toxicities than mono-immunotherapy. Lab work will check for CBC, CMP, and thyroid function. The patient was given instructions on obtaining detailed resources on the medication through the drug 's website and was provided a copy of the patient's drug information sheet from [employer].  PLAN: --CBC, CMP, TSH/FT4 and hepatitis panel in anticipation to start immunotherapy --plan for Nivo 240 mg D1, 15 and D29 and Ipi 1mg/kg D1 every 6 weeks for 2 cycles --repeat PET/CT after 12 weeks of treatment --IR biopsy of cervical lymph node, ordered by CTS team --f/u thoracic sx as scheduled  # Extensive cardiac hx  --f/u Dr. Kenney as scheduled   # Incidental PE  --c/w eliquis 5 mg BID   RTC in 1 week post-D1 of immunotherapy. CBC, CMP 1-2 days before each infusion.   [Palliative Care Plan] : not applicable at this time

## 2025-07-26 NOTE — HISTORY OF PRESENT ILLNESS
[Treatment Protocol] : Treatment Protocol [ECOG Performance Status: 1 - Restricted in physically strenuous activity but ambulatory and able to carry out work of a light or sedentary nature] : Performance Status: 1 - Restricted in physically strenuous activity but ambulatory and able to carry out work of a light or sedentary nature, e.g., light house work, office work [de-identified] : 60-year-old male with history of CAD/MI, PVD, DM, DL, Ischemic Cardiomyopathy (EF 24% on TTE 7/6/25), PVC induced polymorphic VTs s/p DC-ICD presnted to the hospital from 7/5/25 to 7/15/25 due to SOB and fatigue. He was found to be hypotensive with hb 5.8 s/p 2U of PRBC. Additionally, he was found to have MSSA and group B strep bacteremia.  Patient underwent EGD and colonoscopy 7/9, which demonstrated at 10 cm infiltrative, fungating, ulcerated, necrotic mass with sequelae of recent bleeding that extended from 30 cm past incisors to cardia of the stomach. Patient denies any prior endoscopy studies. He also denies smoking or alcohol use (lifelong), or personal history of cancer. 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 No right heart strain. Lower esophageal wall thickening up to the proximal stomach measuring approximately 4.6 x 8.3 x 9.3 cm, compatible with endoscopy finding of esophageal mass.  IMPRESSION: 1.  Marked circumferential masslike distal esophageal wall thickening, compatible with known esophageal mass. 2.Nonspecific mixed sclerotic/lucent lesion in the left iliac bone. Benign etiology is favored. However, further evaluation with a PET/CT or nuclear medicine bone scan may be of use. 3.  No abdominopelvic lymphadenopathy or definite sites of metastatic disease. 4.  Enlarged prostate gland.  He subsequently underwent PET/CT: IMPRESSION: Intense pathologic FDG uptake (SUV up to 11.1),co registering with known distal esophageal extending to the gastroesophageal junction mass measuring up to 9.2 cm in height on coronal image emission images In addition pathologic FDG uptake in multiple right paratracheal nodes, carinal , right and left hilar and left suprahilar lymph nodes is suspicious for biologic tumor activity No other definite sites of pathologic FDG uptake.  Today, Daniel presents with his wife for the evaluation. He reports feeling stronger since discharged from the hospital. He is motivated to beat his cancer. He has no new complaint to offer. He continues to report fatigue and SOB on exertion.  Family History : - Maternal grandfather had cancer, unknown - Maternal uncle had brain cancer Social History : - Occupation: Retired from construction (cement and concrete work) - Alcohol use: Social drinker, occasional drink with dinner - Tobacco use: Never smoker - Substance use: No reported illicit drug use [FreeTextEntry1] : Planning for Nivo 240 mg every 2 weeks and Ipi 1 mg/kg every 6 weeks for 12 weeks followed by surgery and adjuvant nivolumab 480 mg every 4 weeks for 9 [facility] [place]

## 2025-07-26 NOTE — CONSULT LETTER
[Dear  ___] : Dear  [unfilled], [Courtesy Letter:] : I had the pleasure of seeing your patient, [unfilled], in my office today. [Please see my note below.] : Please see my note below. [Consult Closing:] : Thank you very much for allowing me to participate in the care of this patient.  If you have any questions, please do not hesitate to contact me. [Sincerely,] : Sincerely, [FreeTextEntry3] : (Shirley Faat) Brendon Cárdenas DO Medical oncology/hematology at [facility]

## 2025-07-26 NOTE — HISTORY OF PRESENT ILLNESS
[Treatment Protocol] : Treatment Protocol [ECOG Performance Status: 1 - Restricted in physically strenuous activity but ambulatory and able to carry out work of a light or sedentary nature] : Performance Status: 1 - Restricted in physically strenuous activity but ambulatory and able to carry out work of a light or sedentary nature, e.g., light house work, office work [de-identified] : 60-year-old male with history of CAD/MI, PVD, DM, DL, Ischemic Cardiomyopathy (EF 24% on TTE 7/6/25), PVC induced polymorphic VTs s/p DC-ICD presnted to the hospital from 7/5/25 to 7/15/25 due to SOB and fatigue. He was found to be hypotensive with hb 5.8 s/p 2U of PRBC. Additionally, he was found to have MSSA and group B strep bacteremia.  Patient underwent EGD and colonoscopy 7/9, which demonstrated at 10 cm infiltrative, fungating, ulcerated, necrotic mass with sequelae of recent bleeding that extended from 30 cm past incisors to cardia of the stomach. Patient denies any prior endoscopy studies. He also denies smoking or alcohol use (lifelong), or personal history of cancer. 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 No right heart strain. Lower esophageal wall thickening up to the proximal stomach measuring approximately 4.6 x 8.3 x 9.3 cm, compatible with endoscopy finding of esophageal mass.  IMPRESSION: 1.  Marked circumferential masslike distal esophageal wall thickening, compatible with known esophageal mass. 2.Nonspecific mixed sclerotic/lucent lesion in the left iliac bone. Benign etiology is favored. However, further evaluation with a PET/CT or nuclear medicine bone scan may be of use. 3.  No abdominopelvic lymphadenopathy or definite sites of metastatic disease. 4.  Enlarged prostate gland.  He subsequently underwent PET/CT: IMPRESSION: Intense pathologic FDG uptake (SUV up to 11.1),co registering with known distal esophageal extending to the gastroesophageal junction mass measuring up to 9.2 cm in height on coronal image emission images In addition pathologic FDG uptake in multiple right paratracheal nodes, carinal , right and left hilar and left suprahilar lymph nodes is suspicious for biologic tumor activity No other definite sites of pathologic FDG uptake.  Today, Daniel presents with his wife for the evaluation. He reports feeling stronger since discharged from the hospital. He is motivated to beat his cancer. He has no new complaint to offer. He continues to report fatigue and SOB on exertion.  Family History : - Maternal grandfather had cancer, unknown - Maternal uncle had brain cancer Social History : - Occupation: Retired from construction (cement and concrete work) - Alcohol use: Social drinker, occasional drink with dinner - Tobacco use: Never smoker - Substance use: No reported illicit drug use [FreeTextEntry1] : Planning for Nivo 240 mg every 2 weeks and Ipi 1 mg/kg every 6 weeks for 12 weeks followed by surgery and adjuvant nivolumab 480 mg every 4 weeks [facility]

## 2025-07-26 NOTE — REVIEW OF SYSTEMS
[Fatigue] : fatigue [Recent Change In Weight] : ~T recent weight change [SOB on Exertion] : shortness of breath during exertion [Negative] : Heme/Lymph [Fever] : no fever [Chills] : no chills [Night Sweats] : no night sweats [Chest Pain] : no chest pain [Palpitations] : no palpitations [Leg Claudication] : no intermittent leg claudication [Lower Ext Edema] : no lower extremity edema [Shortness Of Breath] : no shortness of breath [Wheezing] : no wheezing [Cough] : no cough

## 2025-07-26 NOTE — ASSESSMENT
[FreeTextEntry1] : In summary, Daniel is a 60-year-old male with an extensive cardiac history including CAD/MI, PVD, DM, DL, Ischemic Cardiomyopathy (EF 24% on TTE 7/6/25), PVC induced polymorphic VTs s/p DC-ICD. He was found to have a 10 cm infiltrative, fungating, ulcerated, necrotic mass in the GEJ with sequelae of recent bleeding that extended from 30 cm past the incisors to the cardia of the stomach. 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  I had a detailed discussion with the patient and his wife. I reviewed the radiological findings, pathology, and staging with them. I explained that he has at least Stage III GEJ cancer and possibly metastatic disease. I further explained that he has a rare MMR-deficient GEJ adenocarcinoma and shows increased response to immune checkpoint inhibitors. However, due to the rarity of dMMR GEJ adenocarcinoma, there are no large clinical trials to establish a standard of care. Nevertheless, a few phase II trials have shown that immunotherapy has a high response rate as both neoadjuvant treatment and for metastatic disease.   Since there are concerns about metastasis to the cervical lymph nodes, Daniel is scheduled for IR biopsy. For now, we will treat with neoadjuvant Nivo 240 mg every 2 weeks and Ipi 1 mg/kg every 6 weeks for 12 weeks, as per the [facility] NEONIPIGA trial. Then, we will reevaluate for possible surgery, radiation, or immunotherapy maintenance after repeat imaging.   Lastly, we reviewed the side effect profile of Ipi/Nivo, including but not limited to: allergic reactions, fatigue, rashes, hypotension, decreased appetite, increased liver enzymes, constipation, diarrhea, and immune-mediated reaction. Pt understands that this regimen has a higher rate of ICI-toxicities than mono-immunotherapy. Lab work will check for CBC, CMP, and thyroid function. The patient was given instructions on obtaining detailed resources on the medication through the drug 's website and was provided a copy of the patient's drug information sheet from [employer].  PLAN: --CBC, CMP, TSH/FT4 and hepatitis panel in anticipation to start immunotherapy --plan for Nivo 240 mg D1, 15 and D29 and Ipi 1mg/kg D1 every 6 weeks for 2 cycles --repeat PET/CT after 12 weeks of treatment --IR biopsy of cervical lymph node, ordered by CTS team --f/u thoracic sx as scheduled  # Extensive cardiac hx  --f/u Dr. Kenney as scheduled   # Incidental PE  --c/w eliquis 5 mg BID   RTC in 1 week post-D1 of immunotherapy. CBC, CMP 1-2 days before each infusion.   [Palliative Care Plan] : not applicable at this time

## 2025-07-26 NOTE — ASSESSMENT
[FreeTextEntry1] : In summary, Daniel is a 60-year-old male with an extensive cardiac history including CAD/MI, PVD, DM, DL, Ischemic Cardiomyopathy (EF 24% on TTE 7/6/25), PVC induced polymorphic VTs s/p DC-ICD. He was found to have a 10 cm infiltrative, fungating, ulcerated, necrotic mass in the GEJ with sequelae of recent bleeding that extended from 30 cm past the incisors to the cardia of the stomach. 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  I had a detailed discussion with the patient and his wife. I reviewed the radiological findings, pathology, and staging with them. I explained that he has at least Stage III GEJ cancer and possibly metastatic disease. I further explained that he has a rare MMR-deficient GEJ adenocarcinoma and shows increased response to immune checkpoint inhibitors. However, due to the rarity of dMMR GEJ adenocarcinoma, there are no large clinical trials to establish a standard of care. Nevertheless, a few phase II trials have shown that immunotherapy has a high response rate as both neoadjuvant treatment and for metastatic disease.   Since there are concerns about metastasis to the cervical lymph nodes, Daniel is scheduled for IR biopsy. For now, we will treat with neoadjuvant Nivo 240 mg every 2 weeks and Ipi 1 mg/kg every 6 weeks for 12 weeks, as per the [facility] NEONIPIGA trial. Then, we will reevaluate for possible surgery, radiation, or immunotherapy maintenance after repeat imaging.   Lastly, we reviewed the side effect profile of Ipi/Nivo, including but not limited to: allergic reactions, fatigue, rashes, hypotension, decreased appetite, increased liver enzymes, constipation, diarrhea, and immune-mediated reaction. Pt understands that this regimen has a higher rate of ICI-toxicities than mono-immunotherapy. Lab work will check for CBC, CMP, and thyroid function. The patient was given instructions on obtaining detailed resources on the medication through the drug 's website and was provided a copy of the patient's drug information sheet from [employer].  PLAN: --CBC, CMP, TSH/FT4 and hepatitis panel in anticipation to start immunotherapy --plan for Nivo 240 mg D1, 15 and D29 and Ipi 1mg/kg D1 every 6 weeks for 2 cycles --repeat PET/CT after 12 weeks of treatment --IR biopsy of cervical lymph node, ordered by CTS team --f/u thoracic sx as scheduled  # Extensive cardiac hx  --f/u Dr. Kenney as scheduled   # Incidental PE  --c/w eliquis 5 mg BID   RTC in 1 week post-D1 of immunotherapy. CBC, CMP 1-2 days before each infusion.   [Palliative Care Plan] : not applicable at this time

## 2025-07-26 NOTE — RESULTS/DATA
[FreeTextEntry1] : PET/CT 7/26/25 IMPRESSION: Intense pathologic FDG uptake (SUV up to 11.1),co registering with known distal esophageal extending to the gastroesophageal junction mass measuring up to 9.2 cm in height on coronal image emission images In addition pathologic FDG uptake in multiple right paratracheal nodes, carinal , right and left hilar and left suprahilar lymph nodes suspicious for biologic tumor activity No other definite sites of pathologic FDG uptake

## 2025-07-28 NOTE — PHYSICAL EXAM
[General Appearance - Alert] : alert [General Appearance - In No Acute Distress] : in no acute distress [Sclera] : the sclera and conjunctiva were normal [PERRL With Normal Accommodation] : pupils were equal in size, round, and reactive to light [Extraocular Movements] : extraocular movements were intact [Outer Ear] : the ears and nose were normal in appearance [Oropharynx] : the oropharynx was normal [Neck Appearance] : the appearance of the neck was normal [Neck Cervical Mass (___cm)] : no neck mass was observed [Jugular Venous Distention Increased] : there was no jugular-venous distention [Thyroid Diffuse Enlargement] : the thyroid was not enlarged [Thyroid Nodule] : there were no palpable thyroid nodules [Auscultation Breath Sounds / Voice Sounds] : lungs were clear to auscultation bilaterally [Heart Rate And Rhythm] : heart rate was normal and rhythm regular [Heart Sounds] : normal S1 and S2 [Heart Sounds Gallop] : no gallops [Murmurs] : no murmurs [Heart Sounds Pericardial Friction Rub] : no pericardial rub [Abnormal Walk] : normal gait [Nail Clubbing] : no clubbing  or cyanosis of the fingernails [Musculoskeletal - Swelling] : no joint swelling seen [Motor Tone] : muscle strength and tone were normal [Skin Color & Pigmentation] : normal skin color and pigmentation [Skin Turgor] : normal skin turgor [Deep Tendon Reflexes (DTR)] : deep tendon reflexes were 2+ and symmetric [Sensation] : the sensory exam was normal to light touch and pinprick [No Focal Deficits] : no focal deficits [Oriented To Time, Place, And Person] : oriented to person, place, and time [Impaired Insight] : insight and judgment were intact [Affect] : the affect was normal [Bowel Sounds] : normal bowel sounds [Abdomen Soft] : soft [Abdomen Tenderness] : non-tender [] : no hepato-splenomegaly [Abdomen Mass (___ Cm)] : no abdominal mass palpated

## 2025-07-29 NOTE — HISTORY OF PRESENT ILLNESS
[FreeTextEntry1] :  Mr. GUERO PORTILLO is a 60 year M, Never smoker, that arrives today for a consultation for FDG avid paratracheal, carinal, hilar lymph nodes. Patient is recently diagnosed with esophageal cancer, path proven poorly differentiated adenocarcinoma (7/9/25). Patient was sent to ED for weakness and lethargy, low H/H d/t severe anemia requiring multiple transfusions and evaluation by GI, EGD revealed esophageal mass, CT Chest, 7/10 reveals 4.6 X 8.3 X 9.3 cm in addition to LLL PE, which patient is newly prescribed Eliquis. Recent bacteremia infection, on IV abx course via RUE PICC line. PMHX: HLD, CAD, CHF, HLD, DM, DFU, MI, VFIB s/p ICD placement. Here today for possible surgical discussion.    Their Healthcare team is as follows: PMD: Genny Cardiologist: Milana / Jarrett EP: Pablito Pulmonologist: Diamond Berumen: Avi    ECOG 0, Independent Lives with Wife Never smoker  Denies major psychiatric history

## 2025-07-29 NOTE — ASSESSMENT
[FreeTextEntry1] : Patient presented with his wife, PET /CT images reviewed: -pathologic FDG uptake noted in multiple right paratracheal nodes, carinal, right hilum, left hilum, and left suprahilar  lymph nodes suspicious for biologic tumor activity -Alternatives discussed such as biopsy of subclavian lymph node for staging  Plan #Esophageal Mass -S/P Biopsy via GI, path proven poorly differentiated adenocarcinoma -Follows [facility]: Dr. Cárdenas (appointment 8/8) will plan to start Immunotherapy for 3-6 months -Possible chemo/RT vs resection after immunotherapy  #Bacteremia -Cont. IV abx -Follows ID  #Lymphadenopathy -Presented in Pulmonary conference -Referral placed for CT guided IR biopsy referral in place (FNA cervical lymph nodes) -F/U CTS for path results  Patient seen and examined with NP.  60 year old male with PMH of CAD, MI, CHF, Vfib s/p ICD placement, EF 20-30% diagnosed with esophageal adenocarcinoma now follow up with thoracic surgery for management. Patient is currently seen by Dr. Cárdenas for immunotherapy. Plan for restage after neoadjuvant with decision for surgery vs definitive chemoradiation. Patient is doing well tolerating liquid and solid in good spirit. Plan for follow up after neoadjuvant treatment with repeat CT to discuss repeat EGD and possible surgical resection.   30 minutes spent speaking with patient and performing physical examination and review of imaging.   Ryan Guzmán MD Attending Thoracic Surgeon  Department of Cardiothoracic Surgery  [facility]

## 2025-07-29 NOTE — ASSESSMENT
[FreeTextEntry1] : Patient presented with his wife, PET /CT images reviewed: -pathologic FDG uptake noted in multiple right paratracheal nodes, carinal, right hilum, left hilum, and left suprahilar  lymph nodes suspicious for biologic tumor activity -Alternatives discussed such as biopsy of subclavian lymph node for staging  Plan #Esophageal Mass -S/P Biopsy via GI, path proven poorly differentiated adenocarcinoma -Follows [facility]: Dr. Cárdenas (appointment 8/8) will plan to start Immunotherapy for 3-6 months -Possible chemo/RT vs resection after immunotherapy  #Bacteremia -Cont. IV abx -Follows ID  #Lymphadenopathy -Presented in Pulmonary conference -Referral placed for CT guided IR biopsy referral in place (FNA cervical lymph nodes) -F/U CTS for path results  Patient seen and examined with NP.  60 year old male with PMH of CAD, MI, CHF, Vfib s/p ICD placement, EF 20-30% diagnosed with esophageal adenocarcinoma now follow up with thoracic surgery for management. Patient is currently seen by Dr. Cárdenas for immunotherapy. Plan for restage after neoadjuvant with decision for surgery vs definitive chemoradiation. Patient is doing well tolerating liquid and solid in good spirit. Plan for follow up after neoadjuvant treatment with repeat CT to discuss repeat EGD and possible surgical resection.   30 minutes spent speaking with patient and performing physical examination and review of imaging.   Ryan Guzmán MD Attending Thoracic Surgeon  Department of Cardiothoracic Surgery  [facility]

## 2025-07-29 NOTE — ASSESSMENT
[FreeTextEntry1] : Patient presented with his wife, PET /CT images reviewed: -pathologic FDG uptake noted in multiple right paratracheal nodes, carinal, right hilum, left hilum, and left suprahilar  lymph nodes suspicious for biologic tumor activity -Alternatives discussed such as biopsy of subclavian lymph node for staging  Plan #Esophageal Mass -S/P Biopsy via GI, path proven poorly differentiated adenocarcinoma -Follows [facility]: Dr. Cárdenas (appointment 8/8) will plan to start Immunotherapy for 3-6 months -Possible chemo/RT vs resection after immunotherapy  #Bacteremia -Cont. IV abx -Follows ID  #Lymphadenopathy -Presented in Pulmonary conference -Referral placed for CT guided IR biopsy referral in place (FNA cervical lymph nodes) -F/U CTS for path results  Patient seen and examined with NP.  60 year old male with PMH of CAD, MI, CHF, Vfib s/p ICD placement, EF 20-30% diagnosed with esophageal adenocarcinoma now follow up with thoracic surgery for management. Patient is currently seen by Dr. Cárdenas for immunotherapy. Plan for restage after neoadjuvant with decision for surgery vs definitive chemoradiation. Patient is doing well tolerating liquid and solid in good spirit. Plan for follow up after neoadjuvant treatment with repeat CT to discuss repeat EGD and possible surgical resection.   30 minutes spent speaking with patient and performing physical examination and review of imaging.   Ryan Guzmán MD Attending Thoracic Surgeon  Department of Cardiothoracic Surgery  [facility]